# Patient Record
Sex: MALE | Race: ASIAN | Employment: FULL TIME | URBAN - NONMETROPOLITAN AREA
[De-identification: names, ages, dates, MRNs, and addresses within clinical notes are randomized per-mention and may not be internally consistent; named-entity substitution may affect disease eponyms.]

---

## 2022-05-11 ENCOUNTER — HOSPITAL ENCOUNTER (EMERGENCY)
Age: 22
Discharge: HOME | End: 2022-05-11
Attending: EMERGENCY MEDICINE
Payer: COMMERCIAL

## 2022-05-11 ENCOUNTER — APPOINTMENT (OUTPATIENT)
Dept: GENERAL RADIOLOGY | Age: 22
End: 2022-05-11
Payer: COMMERCIAL

## 2022-05-11 VITALS
OXYGEN SATURATION: 100 % | SYSTOLIC BLOOD PRESSURE: 159 MMHG | DIASTOLIC BLOOD PRESSURE: 96 MMHG | RESPIRATION RATE: 16 BRPM | HEART RATE: 87 BPM | TEMPERATURE: 97 F

## 2022-05-11 DIAGNOSIS — S62.339A CLOSED BOXER'S FRACTURE, INITIAL ENCOUNTER: Primary | ICD-10-CM

## 2022-05-11 PROCEDURE — 26605 TREAT METACARPAL FRACTURE: CPT

## 2022-05-11 PROCEDURE — 2500000003 HC RX 250 WO HCPCS: Performed by: EMERGENCY MEDICINE

## 2022-05-11 PROCEDURE — 99283 EMERGENCY DEPT VISIT LOW MDM: CPT

## 2022-05-11 PROCEDURE — 73130 X-RAY EXAM OF HAND: CPT

## 2022-05-11 RX ORDER — LIDOCAINE HYDROCHLORIDE 10 MG/ML
5 INJECTION, SOLUTION EPIDURAL; INFILTRATION; INTRACAUDAL; PERINEURAL ONCE
Status: COMPLETED | OUTPATIENT
Start: 2022-05-11 | End: 2022-05-11

## 2022-05-11 RX ADMIN — LIDOCAINE HYDROCHLORIDE 5 ML: 10 INJECTION, SOLUTION EPIDURAL; INFILTRATION; INTRACAUDAL; PERINEURAL at 03:03

## 2022-05-11 ASSESSMENT — PAIN - FUNCTIONAL ASSESSMENT: PAIN_FUNCTIONAL_ASSESSMENT: NONE - DENIES PAIN

## 2022-05-11 NOTE — ED PROVIDER NOTES
[address] COMPLAINT   Chief Complaint   Patient presents with    Hand Injury     Left. MISSY Martínez is a 20 y.o. male who presents with an injury to the left hand. He punched a table today. He declined pain medication. He got to the point where he lost it, which is why he punched a table. Since punching a table he has had some pain and deformity to his left hand. He has never had this before. No other current complaints or injuries. REVIEW OF SYSTEMS   Musculoskeletal: + left hand pain, no other bony or joint injury   Skin: No lacerations or redness  Neurologic: No numbness or focal extremity weakness      PAST MEDICAL & SURGICAL HISTORY   History reviewed. No pertinent past medical history. History reviewed. No pertinent surgical history. CURRENT MEDICATIONS        ALLERGIES   No Known Allergies     SOCIAL & FAMILY HISTORY   Social History     Socioeconomic History    Marital status: Single     Spouse name: None    Number of children: None    Years of education: None    Highest education level: None   Occupational History    None   Tobacco Use    Smoking status: Never Smoker    Smokeless tobacco: Never Used   Substance and Sexual Activity    Alcohol use: Yes     Comment: \"some weekends\"    Drug use: Never    Sexual activity: None   Other Topics Concern    None   Social History Narrative    None     Social Determinants of Health     Financial Resource Strain:     Difficulty of Paying Living Expenses: Not on file   Food Insecurity:     Worried About Running Out of Food in the Last Year: Not on file    Breanna of Food in the Last Year: Not on file   Transportation Needs:     Lack of Transportation (Medical): Not on file    Lack of Transportation (Non-Medical):  Not on file   Physical Activity:     Days of Exercise per Week: Not on file    Minutes of Exercise per Session: Not on file   Stress:     Feeling of Stress : Not on file Social Connections:     Frequency of Communication with Friends and Family: Not on file    Frequency of Social Gatherings with Friends and Family: Not on file    Attends Buddhist Services: Not on file    Active Member of Clubs or Organizations: Not on file    Attends Club or Organization Meetings: Not on file    Marital Status: Not on file   Intimate Partner Violence:     Fear of Current or Ex-Partner: Not on file    Emotionally Abused: Not on file    Physically Abused: Not on file    Sexually Abused: Not on file   Housing Stability:     Unable to Pay for Housing in the Last Year: Not on file    Number of [place] in the Last Year: Not on file    Unstable Housing in the Last Year: Not on file     History reviewed. No pertinent family history. PHYSICAL EXAM   VITAL SIGNS: BP (!) 159/96   Pulse 87   Temp 97 °F (36.1 °C) (Tympanic)   Resp 16   SpO2 100%   Constitutional: Well developed, well nourished  HENT: Atraumatic, airway patent  NECK: Supple   Respiratory: No respiratory distress, no retractions  Cardiovascular: No JVD  Musculoskeletal: Left hand pain, obvious deformity noted to the fourth meta carpal bone  Vascular: radial pulse 2+  Integument: Well hydrated, no rash   Neurologic: Awake alert, normal flow ofspeech , grossly intact sensation to all digits of left hand. He can move all digits of left hand also. Psychiatric: Cooperative, pleasant affect     RADIOLOGY/PROCEDURES   XR HAND LEFT (MIN 3 VIEWS)   Final Result   Interval reduction of the mid 4th metacarpal fracture. Alignment appears   anatomic. XR HAND LEFT (MIN 3 VIEWS)   Final Result   Displaced and angulated fracture of the 4th metacarpal diaphysis. ED COURSE & MEDICAL DECISION MAKING   Pertinent Imaging studies reviewed and interpreted. (See chart for details)       Procedure: Patient underwent metacarpal block of the fourth digit of his left hand. Subsequently a close reduction was performed. Patient tolerated the procedure well. Alignment was significantly improved and patient was splinted. MDM: Patient had fourth and a carpal fracture when she was still angulated. It was reduced and splinted with x-ray do this. he will follow-up with Ortho tomorrow    Differential diagnosis: includes but not limited to Arterial Injury/Ischemia, Fracture, Dislocation, Compartment Syndrome, Neurologic Deficit/Injury. FINAL IMPRESSION   1.  Closed boxer's fracture, initial encounter        PLAN:   Outpatient treatment, follow-up, and discharge instructions (see EMR)    Electronically signed by: Rubio Rosales MD, 5/11/2022 3:59 AM          Rubio Rosales MD  05/11/22 0404

## 2022-05-11 NOTE — ED NOTES
Dr. Cindy Mclean at bedside for reduction. Splint placed after. Pt tolerated well. Xray to bedside for post-reduction imaging.       Arash Linder RN  05/11/22 [id number] [facility], RN  05/11/22 [id number]

## 2022-05-11 NOTE — ED TRIAGE NOTES
Pt presents to the ED with c/o L hand injury after \"punching a metal table\" approx 1 hour prior to arrival.

## 2022-06-22 ENCOUNTER — HOSPITAL ENCOUNTER (OUTPATIENT)
Age: 22
Discharge: HOME | End: 2022-06-24
Payer: COMMERCIAL

## 2022-06-22 ENCOUNTER — HOSPITAL ENCOUNTER (OUTPATIENT)
Dept: GENERAL RADIOLOGY | Age: 22
Discharge: HOME | End: 2022-06-24
Payer: COMMERCIAL

## 2022-06-22 DIAGNOSIS — S62.325D CLOSED DISPLACED FRACTURE OF SHAFT OF FOURTH METACARPAL BONE OF LEFT HAND WITH ROUTINE HEALING: ICD-10-CM

## 2022-06-22 PROCEDURE — 73120 X-RAY EXAM OF HAND: CPT
